# Patient Record
Sex: MALE | Race: BLACK OR AFRICAN AMERICAN | NOT HISPANIC OR LATINO | URBAN - METROPOLITAN AREA
[De-identification: names, ages, dates, MRNs, and addresses within clinical notes are randomized per-mention and may not be internally consistent; named-entity substitution may affect disease eponyms.]

---

## 2018-06-27 ENCOUNTER — EMERGENCY (EMERGENCY)
Facility: HOSPITAL | Age: 48
LOS: 1 days | Discharge: HOME | End: 2018-06-27
Admitting: EMERGENCY MEDICINE
Payer: COMMERCIAL

## 2018-06-27 VITALS
OXYGEN SATURATION: 98 % | TEMPERATURE: 98 F | HEART RATE: 94 BPM | RESPIRATION RATE: 18 BRPM | DIASTOLIC BLOOD PRESSURE: 82 MMHG | SYSTOLIC BLOOD PRESSURE: 127 MMHG | WEIGHT: 195.11 LBS

## 2018-06-27 PROCEDURE — 99283 EMERGENCY DEPT VISIT LOW MDM: CPT

## 2018-06-27 RX ORDER — IBUPROFEN 200 MG
1 TABLET ORAL
Qty: 16 | Refills: 0 | OUTPATIENT
Start: 2018-06-27

## 2018-06-27 NOTE — ED PROVIDER NOTE - PHYSICAL EXAMINATION
CONSTITUTIONAL: WD,WN. NAD.    SKIN: Normal color and turgor. No rash.    HEAD: NC/AT.  EYES: Conjunctiva clear. EOMI. PERRL.    ENT: Airway patent, OP without erythema, tonsillar swelling or exudate; uvula midline without swelling. Nasal mucosa clear, no rhinorrhea.   RESPIRATORY:  Breathing non-labored. No retractions or accessory muscle use.  Lungs CTA bilat.  CARDIOVASCULAR:  RRR, S1S2. No M/R/G.    +2 bilat radial and DP pulses.  No pulsatile abdominal mass appreciated.  GI:  Abdomen soft, nontender.    MSK: Neck supple with painless ROM.  + left thoracic muscular tenderness.   No tenderness over spine.  No lower extremity edema or calf tenderness.  No joint swelling or ROM limitation.  NEURO: Alert and oriented; CN II-XII grossly intact. Speech clear. 5/5 strength in all extremities.  SILT.  DTR +2 bilat quads.  Rectal tone normal.  Normal balance and gait.

## 2018-06-27 NOTE — ED ADULT NURSE NOTE - CHPI ED SYMPTOMS NEG
no bladder dysfunction/no neck tenderness/no constipation/no bowel dysfunction/no tingling/no motor function loss/no difficulty bearing weight/no numbness/no fatigue/no anorexia

## 2018-06-27 NOTE — ED PROVIDER NOTE - NS ED ROS FT
CONSTITUTIONAL: No fever, chills, or weakness  NEURO: No headache, no dizziness, no syncope; No focal weakness/tingling/numbness  EYES: No visual changes  ENT: No rhinorrhea or sore throat  PULM: No cough or dyspnea  CV: No chest pain or palpitations  GI: No abdominal pain, vomiting, or diarrhea  : No dysuria, hematuria, frequency  MSK: HPI  SKIN: no rash or unusual bruising

## 2018-06-27 NOTE — ED PROVIDER NOTE - OBJECTIVE STATEMENT
pt with Hx DM working as [employer]  c/o left sided thoracic back pain.  It began yesterday during his shift.  He says he was standing for 8 hours on duty and the pain began gradually during the shift.  Pain increases with movement/twisting of his torso. pt with Hx DM working as [employer] [facility] [employer]  c/o left sided thoracic back pain.  It began yesterday during his shift.  He says he was standing for 8 hours on duty and the pain began gradually during the shift.  Pain increases with movement/twisting of his torso. Pt did not take anything for pain.  No radiation of pain to chest, abd, or extremities.  No paresthesia or weakness in extremities.  No bladder or bowel dysfunction.  No fever or chills.  No hx of IVDU, malignancy, steroid use, or weight loss. There was no trauma.  No family hx of Marfan synd or aortic aneurysm.

## 2018-06-27 NOTE — ED PROVIDER NOTE - MEDICAL DECISION MAKING DETAILS
Muscular strain of left thoracic back.  No neuro deficits.  TUNAFISH negative.  Do not suspect AAA or kidney pathology.
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
## 2018-07-01 DIAGNOSIS — S29.012A STRAIN OF MUSCLE AND TENDON OF BACK WALL OF THORAX, INITIAL ENCOUNTER: ICD-10-CM
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
## 2018-07-01 DIAGNOSIS — M54.6 PAIN IN THORACIC SPINE: ICD-10-CM

## 2021-03-17 ENCOUNTER — EMERGENCY (EMERGENCY)
Facility: HOSPITAL | Age: 51
LOS: 1 days | Discharge: HOME | End: 2021-03-17
Admitting: EMERGENCY MEDICINE
Payer: COMMERCIAL

## 2021-03-17 VITALS
TEMPERATURE: 98 F | RESPIRATION RATE: 17 BRPM | OXYGEN SATURATION: 98 % | DIASTOLIC BLOOD PRESSURE: 84 MMHG | SYSTOLIC BLOOD PRESSURE: 160 MMHG | HEART RATE: 100 BPM

## 2021-03-17 DIAGNOSIS — Z20.822 CONTACT WITH AND (SUSPECTED) EXPOSURE TO COVID-19: ICD-10-CM

## 2021-03-17 LAB — SARS-COV-2 RNA SPEC QL NAA+PROBE: SIGNIFICANT CHANGE UP

## 2021-03-17 PROCEDURE — 99282 EMERGENCY DEPT VISIT SF MDM: CPT

## 2021-03-17 PROCEDURE — 99283 EMERGENCY DEPT VISIT LOW MDM: CPT

## 2021-03-17 PROCEDURE — U0003: CPT

## 2021-03-17 PROCEDURE — U0005: CPT

## 2021-03-17 NOTE — ED PROVIDER NOTE - PATIENT PORTAL LINK FT
You can access the FollowMyHealth Patient Portal offered by [facility] by registering at the following website: [email or web address]. By joining [employer]’s FollowMyHealth portal, you will also be able to view your health information using other applications (apps) compatible with our system.

## 2021-03-17 NOTE — ED PROVIDER NOTE - INCLUDE COVID-19 DISCHARGE INSTRUCTIONS
How Severe Are Your Warts?: mild Is This A New Presentation, Or A Follow-Up?: Wart Additional History: Site bx proven Verruca Vulgaris path 1/4/18. Has reappeared and pt wants removed. <-------- Click here to INCLUDE CoVID-19 Discharge Instructions

## 2024-09-23 NOTE — ED ADULT NURSE NOTE - CAS ELECT INFOMATION PROVIDED
Coagulation: Bleeding disorder either through use of anticoagulants or underlying clinical condition(s) DC instructions